# Patient Record
Sex: MALE | Race: BLACK OR AFRICAN AMERICAN | NOT HISPANIC OR LATINO | Employment: OTHER | URBAN - METROPOLITAN AREA
[De-identification: names, ages, dates, MRNs, and addresses within clinical notes are randomized per-mention and may not be internally consistent; named-entity substitution may affect disease eponyms.]

---

## 2023-10-29 ENCOUNTER — OFFICE VISIT (OUTPATIENT)
Dept: URGENT CARE | Facility: CLINIC | Age: 78
End: 2023-10-29
Payer: COMMERCIAL

## 2023-10-29 ENCOUNTER — HOSPITAL ENCOUNTER (EMERGENCY)
Facility: HOSPITAL | Age: 78
Discharge: HOME | End: 2023-10-29
Attending: EMERGENCY MEDICINE
Payer: COMMERCIAL

## 2023-10-29 VITALS
OXYGEN SATURATION: 97 % | SYSTOLIC BLOOD PRESSURE: 130 MMHG | RESPIRATION RATE: 16 BRPM | HEART RATE: 62 BPM | TEMPERATURE: 98 F | BODY MASS INDEX: 25.91 KG/M2 | DIASTOLIC BLOOD PRESSURE: 62 MMHG | HEIGHT: 70 IN | WEIGHT: 181 LBS

## 2023-10-29 VITALS
WEIGHT: 181 LBS | SYSTOLIC BLOOD PRESSURE: 163 MMHG | OXYGEN SATURATION: 99 % | DIASTOLIC BLOOD PRESSURE: 76 MMHG | TEMPERATURE: 98 F | BODY MASS INDEX: 25.97 KG/M2 | HEART RATE: 68 BPM | RESPIRATION RATE: 20 BRPM

## 2023-10-29 DIAGNOSIS — R55 SYNCOPE: Primary | ICD-10-CM

## 2023-10-29 DIAGNOSIS — R55 SYNCOPE, UNSPECIFIED SYNCOPE TYPE: Primary | ICD-10-CM

## 2023-10-29 DIAGNOSIS — E11.69 TYPE 2 DIABETES MELLITUS WITH OTHER SPECIFIED COMPLICATION, WITHOUT LONG-TERM CURRENT USE OF INSULIN: ICD-10-CM

## 2023-10-29 LAB
ALBUMIN SERPL BCP-MCNC: 3.6 G/DL (ref 3.5–5.2)
ALP SERPL-CCNC: 89 U/L (ref 55–135)
ALT SERPL W/O P-5'-P-CCNC: 23 U/L (ref 10–44)
ANION GAP SERPL CALC-SCNC: 17 MMOL/L (ref 8–16)
AST SERPL-CCNC: 22 U/L (ref 10–40)
BACTERIA #/AREA URNS AUTO: NORMAL /HPF
BASOPHILS # BLD AUTO: 0.03 K/UL (ref 0–0.2)
BASOPHILS NFR BLD: 0.5 % (ref 0–1.9)
BILIRUB SERPL-MCNC: 0.6 MG/DL (ref 0.1–1)
BILIRUB UR QL STRIP: NEGATIVE
BUN SERPL-MCNC: 27 MG/DL (ref 8–23)
CALCIUM SERPL-MCNC: 9.2 MG/DL (ref 8.7–10.5)
CHLORIDE SERPL-SCNC: 100 MMOL/L (ref 95–110)
CLARITY UR REFRACT.AUTO: CLEAR
CO2 SERPL-SCNC: 23 MMOL/L (ref 23–29)
COLOR UR AUTO: YELLOW
CREAT SERPL-MCNC: 1.5 MG/DL (ref 0.5–1.4)
DIFFERENTIAL METHOD: ABNORMAL
EOSINOPHIL # BLD AUTO: 0 K/UL (ref 0–0.5)
EOSINOPHIL NFR BLD: 0.2 % (ref 0–8)
ERYTHROCYTE [DISTWIDTH] IN BLOOD BY AUTOMATED COUNT: 13.4 % (ref 11.5–14.5)
EST. GFR  (NO RACE VARIABLE): 48.2 ML/MIN/1.73 M^2
GLUCOSE SERPL-MCNC: 221 MG/DL (ref 70–110)
GLUCOSE SERPL-MCNC: 276 MG/DL (ref 70–110)
GLUCOSE UR QL STRIP: ABNORMAL
HCT VFR BLD AUTO: 40.3 % (ref 40–54)
HGB BLD-MCNC: 13.3 G/DL (ref 14–18)
HGB UR QL STRIP: NEGATIVE
HYALINE CASTS UR QL AUTO: 1 /LPF
IMM GRANULOCYTES # BLD AUTO: 0.01 K/UL (ref 0–0.04)
IMM GRANULOCYTES NFR BLD AUTO: 0.2 % (ref 0–0.5)
KETONES UR QL STRIP: ABNORMAL
LEUKOCYTE ESTERASE UR QL STRIP: NEGATIVE
LYMPHOCYTES # BLD AUTO: 2 K/UL (ref 1–4.8)
LYMPHOCYTES NFR BLD: 33.7 % (ref 18–48)
MCH RBC QN AUTO: 29.2 PG (ref 27–31)
MCHC RBC AUTO-ENTMCNC: 33 G/DL (ref 32–36)
MCV RBC AUTO: 88 FL (ref 82–98)
MICROSCOPIC COMMENT: NORMAL
MONOCYTES # BLD AUTO: 0.4 K/UL (ref 0.3–1)
MONOCYTES NFR BLD: 7.5 % (ref 4–15)
NEUTROPHILS # BLD AUTO: 3.4 K/UL (ref 1.8–7.7)
NEUTROPHILS NFR BLD: 57.9 % (ref 38–73)
NITRITE UR QL STRIP: NEGATIVE
NRBC BLD-RTO: 0 /100 WBC
PH UR STRIP: 6 [PH] (ref 5–8)
PLATELET # BLD AUTO: 160 K/UL (ref 150–450)
PMV BLD AUTO: 10.9 FL (ref 9.2–12.9)
POCT GLUCOSE: 243 MG/DL (ref 70–110)
POTASSIUM SERPL-SCNC: 3.7 MMOL/L (ref 3.5–5.1)
PROT SERPL-MCNC: 6.9 G/DL (ref 6–8.4)
PROT UR QL STRIP: ABNORMAL
RBC # BLD AUTO: 4.56 M/UL (ref 4.6–6.2)
RBC #/AREA URNS AUTO: 2 /HPF (ref 0–4)
SODIUM SERPL-SCNC: 140 MMOL/L (ref 136–145)
SP GR UR STRIP: 1.03 (ref 1–1.03)
SQUAMOUS #/AREA URNS AUTO: 0 /HPF
TROPONIN I SERPL DL<=0.01 NG/ML-MCNC: 0.01 NG/ML (ref 0–0.03)
URN SPEC COLLECT METH UR: ABNORMAL
WBC # BLD AUTO: 5.85 K/UL (ref 3.9–12.7)
WBC #/AREA URNS AUTO: 1 /HPF (ref 0–5)
YEAST UR QL AUTO: NORMAL

## 2023-10-29 PROCEDURE — 82962 GLUCOSE BLOOD TEST: CPT

## 2023-10-29 PROCEDURE — 93010 EKG 12-LEAD: ICD-10-PCS | Mod: ,,, | Performed by: INTERNAL MEDICINE

## 2023-10-29 PROCEDURE — 84484 ASSAY OF TROPONIN QUANT: CPT | Performed by: PHYSICIAN ASSISTANT

## 2023-10-29 PROCEDURE — 82962 POCT GLUCOSE, HAND-HELD DEVICE: ICD-10-PCS | Mod: S$GLB,,, | Performed by: NURSE PRACTITIONER

## 2023-10-29 PROCEDURE — 93010 ELECTROCARDIOGRAM REPORT: CPT | Mod: ,,, | Performed by: INTERNAL MEDICINE

## 2023-10-29 PROCEDURE — 80053 COMPREHEN METABOLIC PANEL: CPT | Performed by: PHYSICIAN ASSISTANT

## 2023-10-29 PROCEDURE — 99285 EMERGENCY DEPT VISIT HI MDM: CPT | Mod: 25

## 2023-10-29 PROCEDURE — 93005 ELECTROCARDIOGRAM TRACING: CPT

## 2023-10-29 PROCEDURE — 99203 OFFICE O/P NEW LOW 30 MIN: CPT | Mod: S$GLB,,, | Performed by: NURSE PRACTITIONER

## 2023-10-29 PROCEDURE — 82962 GLUCOSE BLOOD TEST: CPT | Mod: S$GLB,,, | Performed by: NURSE PRACTITIONER

## 2023-10-29 PROCEDURE — 63600175 PHARM REV CODE 636 W HCPCS: Performed by: PHYSICIAN ASSISTANT

## 2023-10-29 PROCEDURE — 99203 PR OFFICE/OUTPT VISIT, NEW, LEVL III, 30-44 MIN: ICD-10-PCS | Mod: S$GLB,,, | Performed by: NURSE PRACTITIONER

## 2023-10-29 PROCEDURE — 81001 URINALYSIS AUTO W/SCOPE: CPT | Performed by: PHYSICIAN ASSISTANT

## 2023-10-29 PROCEDURE — 85025 COMPLETE CBC W/AUTO DIFF WBC: CPT | Performed by: PHYSICIAN ASSISTANT

## 2023-10-29 RX ORDER — LANCETS 33 GAUGE
EACH MISCELLANEOUS
COMMUNITY
Start: 2023-08-31

## 2023-10-29 RX ORDER — AMLODIPINE BESYLATE 10 MG/1
10 TABLET ORAL
COMMUNITY
Start: 2023-07-18

## 2023-10-29 RX ORDER — BLOOD-GLUCOSE METER
EACH MISCELLANEOUS
COMMUNITY
Start: 2023-08-31

## 2023-10-29 RX ORDER — TERAZOSIN 2 MG/1
2 CAPSULE ORAL
COMMUNITY
Start: 2022-11-08 | End: 2024-11-07

## 2023-10-29 RX ORDER — ATORVASTATIN CALCIUM 40 MG/1
40 TABLET, FILM COATED ORAL
COMMUNITY
Start: 2023-07-17

## 2023-10-29 RX ORDER — BLOOD-GLUCOSE CONTROL, NORMAL
EACH MISCELLANEOUS
COMMUNITY
Start: 2023-08-09 | End: 2025-08-08

## 2023-10-29 RX ORDER — ATORVASTATIN CALCIUM 40 MG/1
1 TABLET, FILM COATED ORAL DAILY
COMMUNITY
Start: 2023-07-17 | End: 2025-07-16

## 2023-10-29 RX ORDER — TERAZOSIN 2 MG/1
2 CAPSULE ORAL NIGHTLY
COMMUNITY
Start: 2023-07-17

## 2023-10-29 RX ORDER — INSULIN PUMP SYRINGE, 3 ML
EACH MISCELLANEOUS
COMMUNITY
Start: 2023-08-09 | End: 2025-08-08

## 2023-10-29 RX ORDER — BLOOD-GLUCOSE METER
1 EACH MISCELLANEOUS 2 TIMES DAILY
COMMUNITY
Start: 2023-08-31

## 2023-10-29 RX ORDER — METFORMIN HYDROCHLORIDE 500 MG/1
500 TABLET, EXTENDED RELEASE ORAL 2 TIMES DAILY
COMMUNITY
Start: 2023-10-04

## 2023-10-29 RX ORDER — AMLODIPINE BESYLATE 10 MG/1
10 TABLET ORAL
COMMUNITY
Start: 2023-07-17 | End: 2025-07-16

## 2023-10-29 RX ORDER — DEXTROSE 4 G
TABLET,CHEWABLE ORAL
COMMUNITY
Start: 2023-08-09 | End: 2025-08-08

## 2023-10-29 RX ORDER — LANCETS 30 GAUGE
EACH MISCELLANEOUS
COMMUNITY
Start: 2023-08-31

## 2023-10-29 RX ORDER — METFORMIN HYDROCHLORIDE 500 MG/1
1000 TABLET, EXTENDED RELEASE ORAL
COMMUNITY
Start: 2023-08-16 | End: 2025-08-15

## 2023-10-29 RX ADMIN — SODIUM CHLORIDE, POTASSIUM CHLORIDE, SODIUM LACTATE AND CALCIUM CHLORIDE 1000 ML: 600; 310; 30; 20 INJECTION, SOLUTION INTRAVENOUS at 03:10

## 2023-10-29 NOTE — DISCHARGE INSTRUCTIONS
You should follow-up with your primary doctor as soon as possible for re-evaluation of your fainting episode.  Make sure that you eat in the morning before you take your diabetic medications.  You are medically cleared for travel.  Return to the ER for any new or significantly worsening symptoms just as multiple episodes fainting, chest pain, fever, uncontrolled vomiting, difficulty breathing or any other worrisome symptoms.

## 2023-10-29 NOTE — ED TRIAGE NOTES
"Pt arrives to ED stating he did not eat today because he was trying to catch a flight. Pt has diabetes type 2. Pt states he fainted while on a bus. Pt states after he fainted he ate and had juice. Pt states he "feels good" now. Pt states he was told he needs to be cleared in order to get back on the plane and that is why he is here.   "

## 2023-10-29 NOTE — PROGRESS NOTES
"Subjective:      Patient ID: Pardeep Carbajal is a 77 y.o. male.    Vitals:  height is 5' 10" (1.778 m) and weight is 82.1 kg (181 lb). His oral temperature is 98 °F (36.7 °C). His blood pressure is 130/62 and his pulse is 62. His respiration is 16 and oxygen saturation is 97%.     Chief Complaint: Blood Sugar Problem    78yo male pt with HX of T2DM, HLD, and CKD stage 2.  Reports that he was rushing to catch an airplane this morning and took his metformin without eating (reports only metformin for T2DM, denies insulin or any other medication use for diabetes).  Reports that while he was at the airport in the shuttle bus, he fell down while standing up in the bus.  Reports that he did not lose consciousness at this time, did not strike his head.  Reports that when he got into the terminal and was trying to use the kiosk to check-in that he passed out again, this time being told that he lost consciousness for approximately 20 seconds by the airport staff.  Reports that an EMT at the airport came to his aid, gave him orange juice and a cookie, and then they checked his BSG 10-15 minutes afterwards.  States that the EMT stated "it looks perfect," but states that he did not tell him what his actual BSG reading was, and states that they did not check it before giving him the snack.    Pt states that he then went to [facility] with his wife, as they had missed the flight, and then reported here as he was told by the airline that he was required to get an emergency services evaluation to "clear him" to fly tomorrow.    Loss of Consciousness  This is a new problem. The current episode started today. The problem occurs constantly. The problem has been unchanged. He lost consciousness for a period of less than 1 minute. Nothing aggravates the symptoms. Pertinent negatives include no abdominal pain, auditory change, aura, back pain, bladder incontinence, bowel incontinence, chest pain, clumsiness, confusion, diaphoresis, dizziness, " fever, focal sensory loss, focal weakness, headaches, light-headedness, malaise/fatigue, nausea, palpitations, slurred speech, vertigo, visual change, vomiting or weakness. He has tried eating for the symptoms. The treatment provided moderate relief. There is no history of arrhythmia, CAD, a clotting disorder, CVA, DM, HTN, seizures, a sudden death in family, TIA or vertigo.       Constitution: Negative for sweating and fever.   Cardiovascular:  Positive for passing out. Negative for chest pain and palpitations.   Gastrointestinal:  Negative for abdominal pain, nausea, vomiting and bowel incontinence.   Genitourinary:  Negative for bladder incontinence.   Musculoskeletal:  Negative for back pain.   Neurological:  Positive for passing out, loss of balance, altered mental status and loss of consciousness. Negative for dizziness, history of vertigo, light-headedness, focal weakness, speech difficulty, coordination disturbances, headaches, numbness, tingling and seizures.   Hematologic/Lymphatic: Negative for history of blood clots.   Psychiatric/Behavioral:  Positive for altered mental status. Negative for confusion.       Objective:     Physical Exam   Constitutional: He is oriented to person, place, and time. He appears well-developed. He is cooperative.  Non-toxic appearance. He does not appear ill. No distress.   HENT:   Head: Normocephalic and atraumatic.   Ears:   Right Ear: Hearing, tympanic membrane, external ear and ear canal normal.   Left Ear: Hearing, tympanic membrane, external ear and ear canal normal.   Nose: Nose normal. No mucosal edema, rhinorrhea or nasal deformity. No epistaxis. Right sinus exhibits no maxillary sinus tenderness and no frontal sinus tenderness. Left sinus exhibits no maxillary sinus tenderness and no frontal sinus tenderness.   Mouth/Throat: Uvula is midline, oropharynx is clear and moist and mucous membranes are normal. No trismus in the jaw. Normal dentition. No uvula swelling. No  posterior oropharyngeal erythema.   Eyes: Conjunctivae, EOM and lids are normal. Pupils are equal, round, and reactive to light. No scleral icterus.   Neck: Trachea normal and phonation normal. Neck supple. No neck rigidity present.   Cardiovascular: Normal rate, regular rhythm, S1 normal, S2 normal, normal heart sounds and normal pulses.   No murmur heard.  Pulses:       Radial pulses are 2+ on the right side and 2+ on the left side.        Dorsalis pedis pulses are 2+ on the right side and 2+ on the left side.   Pulmonary/Chest: Effort normal and breath sounds normal. No accessory muscle usage or stridor. No tachypnea. No respiratory distress. He has no decreased breath sounds. He has no wheezes. He has no rhonchi. He has no rales.   Abdominal: Normal appearance and bowel sounds are normal. He exhibits no distension. Soft. There is no abdominal tenderness.   Musculoskeletal: Normal range of motion.         General: No deformity. Normal range of motion.      Right lower leg: No edema.      Left lower leg: No edema.   Neurological: He is alert and oriented to person, place, and time. He displays no weakness, no atrophy and facial symmetry. No cranial nerve deficit. He exhibits normal muscle tone. He has an abnormal Tandem Gait Test (difficult for pt to complete). He has a normal Finger-Nose-Finger Test. Coordination: Romberg sign positive. Coordination: Heel to shin test normal and Rapid alternating movements normal. He shows no pronator drift. Coordination and gait normal. GCS eye subscore is 4. GCS verbal subscore is 5. GCS motor subscore is 6.   Skin: Skin is warm, dry, intact, not diaphoretic and not pale.   Psychiatric: He experiences Normal attention and Normal perception. His speech is normal and behavior is normal. Mood, memory, affect, judgment and thought content normal. Cognition normal  Nursing note and vitals reviewed.      Results for orders placed or performed in visit on 10/29/23   POCT Glucose,  Hand-Held Device   Result Value Ref Range    POC Glucose 276 (A) 70 - 110 MG/DL         Assessment:     1. Syncope, unspecified syncope type    2. Type 2 diabetes mellitus with other specified complication, without long-term current use of insulin        Plan:     Discussed MDM with pt, explained that as an urgent care provider, I am unable to test for all of the possible reasons for his syncopal episodes (including CVA, SDH, and derangements of kidney and liver function), especially with now elevated BSG and positive Romberg test on examination.      Advised pt to go to [facility] ER for full diagnostic work-up if he requires official medical clearance to fly, or to contact his PCP at home to discuss.  Pt verbalized understanding and agreed to plan.      Syncope, unspecified syncope type  -     POCT Glucose, Hand-Held Device    Type 2 diabetes mellitus with other specified complication, without long-term current use of insulin      Patient Instructions   If you require official medical clearance to fly, please go to [facility] for a full diagnostic work-up.    -----    If your condition worsens or fails to improve, we recommend that you receive another evaluation at the ER immediately, contact your PCP to discuss your concerns, or return here.  You must understand that you've received an urgent care treatment only, and that you may be released before all your medical problems are known or treated.  You, the patient, will arrange for follow-up care as instructed.     If you were given narcotic prescriptions or muscle relaxants, do not operate heavy equipment or machinery while taking these medications.    Get rest and drink fluids.  Watch for increase in pain, fever, vomiting, neck pain, or mental confusion.     You can also use Tylenol or Ibuprofen as directed as long as you don't have any allergies to these medications or medical conditions (such as ulcers, liver or kidney disease, or  use blood thinners, etc.) that would prevent you from taking these meds.

## 2023-10-29 NOTE — PATIENT INSTRUCTIONS
If you require official medical clearance to fly, please go to [facility] for a full diagnostic work-up.    -----    If your condition worsens or fails to improve, we recommend that you receive another evaluation at the ER immediately, contact your PCP to discuss your concerns, or return here.  You must understand that you've received an urgent care treatment only, and that you may be released before all your medical problems are known or treated.  You, the patient, will arrange for follow-up care as instructed.     If you were given narcotic prescriptions or muscle relaxants, do not operate heavy equipment or machinery while taking these medications.    Get rest and drink fluids.  Watch for increase in pain, fever, vomiting, neck pain, or mental confusion.     You can also use Tylenol or Ibuprofen as directed as long as you don't have any allergies to these medications or medical conditions (such as ulcers, liver or kidney disease, or use blood thinners, etc.) that would prevent you from taking these meds.

## 2023-10-29 NOTE — Clinical Note
"Pardeep Fuharrison Carbajal was seen and treated in our emergency department on 10/29/2023.  He is medically cleared to fly on 10/29/2023.        If you have any questions or concerns, please don't hesitate to call.      Boaz Bowers PA-C"

## 2023-10-29 NOTE — ED PROVIDER NOTES
Encounter Date: 10/29/2023       History     Chief Complaint   Patient presents with    Hypoglycemia     Became weak and airport, lost balance. Sugar 243 in triage      77-year-old male with type 2 DM, hypertension presents to the ED for evaluation of syncope.  Patient 2 episodes where he felt lightheaded.  The 2nd episode he states that he felt lightheaded, went slowly to the floor and lost consciousness for about 30 seconds.  He states that he took his metformin this morning without eating.  He denies chest pain, nausea, vomiting, diarrhea, dark or bloody stool, recent illness, change in urination or other acute complaints.  Patient was seen in urgent care and directed to the ED for further evaluation.  Patient did not have his blood sugar measured at the time of the episode.  Patient states that his blood sugar was checked after he had orange juice and it was greater than 200.      Review of patient's allergies indicates:  No Known Allergies  Past Medical History:   Diagnosis Date    Essential (primary) hypertension     Type 2 diabetes mellitus without complications      No past surgical history on file.  No family history on file.  Social History     Tobacco Use    Smoking status: Never    Smokeless tobacco: Never     Review of Systems   Neurological:  Positive for syncope and light-headedness.       Physical Exam     Initial Vitals [10/29/23 1258]   BP Pulse Resp Temp SpO2   (!) 141/79 85 16 98 °F (36.7 °C) 95 %      MAP       --         Physical Exam    Nursing note and vitals reviewed.  Constitutional: He appears well-developed and well-nourished.  Non-toxic appearance. He does not appear ill. No distress.   HENT:   Head: Normocephalic and atraumatic.   Eyes: Conjunctivae and EOM are normal. Pupils are equal, round, and reactive to light. No scleral icterus.   Neck: Neck supple.   Normal range of motion.  Cardiovascular:  Normal rate and regular rhythm.     Exam reveals no gallop, no distant heart sounds and  no friction rub.       No murmur heard.  Pulses:       Radial pulses are 2+ on the right side and 2+ on the left side.   Pulmonary/Chest: Effort normal and breath sounds normal. No accessory muscle usage. No tachypnea. No respiratory distress. He has no decreased breath sounds. He has no wheezes. He has no rhonchi. He has no rales.   Abdominal: He exhibits no distension.   Musculoskeletal:      Cervical back: Normal range of motion and neck supple.     Neurological: He is alert. He has normal strength. No sensory deficit.   Speech is clear and fluent.  No facial droop or asymmetry.   Skin: No rash noted.         ED Course   Procedures  Labs Reviewed   CBC W/ AUTO DIFFERENTIAL - Abnormal; Notable for the following components:       Result Value    RBC 4.56 (*)     Hemoglobin 13.3 (*)     All other components within normal limits   COMPREHENSIVE METABOLIC PANEL - Abnormal; Notable for the following components:    Glucose 221 (*)     BUN 27 (*)     Creatinine 1.5 (*)     eGFR 48.2 (*)     Anion Gap 17 (*)     All other components within normal limits   URINALYSIS, REFLEX TO URINE CULTURE - Abnormal; Notable for the following components:    Protein, UA Trace (*)     Glucose, UA 3+ (*)     Ketones, UA Trace (*)     All other components within normal limits    Narrative:     Specimen Source->Urine   POCT GLUCOSE - Abnormal; Notable for the following components:    POCT Glucose 243 (*)     All other components within normal limits   TROPONIN I   URINALYSIS MICROSCOPIC    Narrative:     Specimen Source->Urine          Imaging Results              X-Ray Chest PA And Lateral (Final result)  Result time 10/29/23 15:21:31      Final result by Frankie Wu Jr., MD (10/29/23 15:21:31)                   Impression:      No acute cardiopulmonary abnormality.      Electronically signed by: Frankie Hare Jr  Date:    10/29/2023  Time:    15:21               Narrative:    EXAMINATION:  XR CHEST PA AND LATERAL    CLINICAL  HISTORY:  Syncope and collapse    TECHNIQUE:  PA and lateral views of the chest were performed.    COMPARISON:  None    FINDINGS:  The cardiac silhouette is normal in size. Markedly tortuous thoracic aorta.    The lungs are clear, with normal appearance of pulmonary vasculature and no pleural effusion or pneumothorax.    Bones are intact.                                       Medications   lactated ringers bolus 1,000 mL (0 mLs Intravenous Stopped 10/29/23 7764)     Medical Decision Making  77-year-old male presents to the ED for evaluation of syncope with preceding lightheadedness.  He is hemodynamically stable.  Well-appearing.  Afebrile.    Labs show creatinine of 1.5.  Patient's baseline is likely around 1.3.  Anion gap is also slightly elevated at 17.  Patient is hyperglycemic to 220s.  Urinalysis with ketones and sugar as well as trace protein present.  No hematuria or infection.  Patient received 1 L of IV fluids in the ED. I discussed this case with my supervising physician.  We feel that patient is ultimately stable for discharge.  He is returning home to [place] tomorrow and will follow-up with his PCP asap.  ED return precautions given.  Patient voiced understanding. I have reviewed the patient's records and discussed this case with my supervising physician.             Amount and/or Complexity of Data Reviewed  Labs: ordered.  Radiology: ordered.                               Clinical Impression:   Final diagnoses:  [R55] Syncope (Primary)        ED Disposition Condition    Discharge Stable          ED Prescriptions    None       Follow-up Information    None          Radha Reed, CANDELARIO  10/29/23 3785     Ciera Valenzuela  (RN)  2020 04:50:51 Ciera Valenzuela  (RN)  2020 07:26:10